# Patient Record
Sex: MALE | Race: WHITE | URBAN - METROPOLITAN AREA
[De-identification: names, ages, dates, MRNs, and addresses within clinical notes are randomized per-mention and may not be internally consistent; named-entity substitution may affect disease eponyms.]

---

## 2020-10-21 RX ORDER — VALACYCLOVIR HYDROCHLORIDE 1 G/1
TABLET, FILM COATED ORAL
Qty: 90 TAB | OUTPATIENT
Start: 2020-10-21

## 2020-10-21 NOTE — TELEPHONE ENCOUNTER
He does not understand why he needs to be seen to get his refills, can you let me know what you would like me to tell him.

## 2020-10-29 ENCOUNTER — VIRTUAL VISIT (OUTPATIENT)
Dept: PRIMARY CARE CLINIC | Age: 50
End: 2020-10-29
Payer: COMMERCIAL

## 2020-10-29 VITALS
HEIGHT: 77 IN | OXYGEN SATURATION: 98 % | RESPIRATION RATE: 16 BRPM | SYSTOLIC BLOOD PRESSURE: 127 MMHG | DIASTOLIC BLOOD PRESSURE: 87 MMHG | TEMPERATURE: 98.4 F

## 2020-10-29 DIAGNOSIS — A60.01 HERPES SIMPLEX INFECTION OF PENIS: Primary | ICD-10-CM

## 2020-10-29 PROCEDURE — 99213 OFFICE O/P EST LOW 20 MIN: CPT | Performed by: NURSE PRACTITIONER

## 2020-10-29 RX ORDER — SILDENAFIL 100 MG/1
100 TABLET, FILM COATED ORAL AS NEEDED
COMMUNITY
End: 2021-06-02

## 2020-10-29 RX ORDER — VALACYCLOVIR HYDROCHLORIDE 1 G/1
1000 TABLET, FILM COATED ORAL DAILY
Qty: 90 TAB | Refills: 3 | Status: SHIPPED | OUTPATIENT
Start: 2020-10-29 | End: 2021-06-02 | Stop reason: SDUPTHER

## 2020-10-29 RX ORDER — VALACYCLOVIR HYDROCHLORIDE 1 G/1
TABLET, FILM COATED ORAL
COMMUNITY
End: 2020-10-29 | Stop reason: SDUPTHER

## 2020-10-29 NOTE — PROGRESS NOTES
HISTORY OF PRESENT ILLNESS  Samantha Kaba is a 53 y.o. male presents for  Follow up on genital herpes and meds for same and med refill for same    Current Drug regimen is valacyclovir 1 gr appropriate and no new symptoms are expressed by patient. Denies CP, Diff breathing, Palpitations recent weight loss or new difficulties with sleep. There were no vitals filed for this visit. There is no problem list on file for this patient. There are no active problems to display for this patient. Current Outpatient Medications   Medication Sig Dispense Refill    valACYclovir (VALTREX) 1 gram tablet Take  by mouth.  sildenafil citrate (VIAGRA) 100 mg tablet Take 100 mg by mouth as needed for Erectile Dysfunction. No Known Allergies  History reviewed. No pertinent past medical history. Past Surgical History:   Procedure Laterality Date    HX OTHER SURGICAL      bone surgery from playing football     Family History   Problem Relation Age of Onset    Heart Disease Father     Other Father         cerebrovascular accident    Heart Disease Maternal Grandfather     Hypertension Maternal Grandfather      Social History     Tobacco Use    Smoking status: Never Smoker    Smokeless tobacco: Never Used   Substance Use Topics    Alcohol use: Yes     Comment: occ           Review of Systems   Constitutional: Negative for fever. Respiratory: Negative for cough and shortness of breath. Cardiovascular: Negative for chest pain and palpitations. Gastrointestinal: Negative for constipation and diarrhea. Neurological: Negative for dizziness. Psychiatric/Behavioral: Negative for depression. The patient is not nervous/anxious and does not have insomnia. Physical Exam  Constitutional:       Appearance: Normal appearance. He is normal weight. HENT:      Head: Normocephalic. Eyes:      Extraocular Movements: Extraocular movements intact.    Neck:      Comments: Normal neck movement as seen on video chat  Pulmonary:      Effort: Pulmonary effort is normal.   Neurological:      General: No focal deficit present. Mental Status: He is alert and oriented to person, place, and time. Psychiatric:         Mood and Affect: Mood normal.         Behavior: Behavior normal.         Thought Content: Thought content normal.         Judgment: Judgment normal.           ASSESSMENT and PLAN    1. Herpes simplex infection of penis  Refill on meds. . labs for checking liver    - CBC WITH AUTOMATED DIFF  - METABOLIC PANEL, COMPREHENSIVE  - valACYclovir (VALTREX) 1 gram tablet; Take 1 Tab by mouth daily. Dispense: 90 Tab; Refill: [address], TAYLER Hernandez who was evaluated through a synchronous (real-time) audio-video encounter, and/or his healthcare decision maker, is aware that it is a billable service, with coverage as determined by his insurance carrier. He provided verbal consent to proceed: Yes, and patient identification was verified. It was conducted pursuant to the emergency declaration under the [address] [place] [address] authority and the [employer] and [employer] Act. A caregiver was present when appropriate. Ability to conduct physical exam was limited. I was at home. The patient was at home. I have reviewed the patient's controlled substance prescription history thru the Prescription Monitoring Program, so that the prescription(s) for a controlled substance can be given.

## 2020-10-29 NOTE — PATIENT INSTRUCTIONS
Learning About Physical Activity What is physical activity? Physical activity is any kind of activity that gets your body moving. The types of physical activity that can help you get fit and stay healthy include: · Aerobic or \"cardio\" activities that make your heart beat faster and make you breathe harder, such as brisk walking, riding a bike, or running. Aerobic activities strengthen your heart and lungs and build up your endurance. · Strength training activities that make your muscles work against, or \"resist,\" something, such as lifting weights or doing push-ups. These activities help tone and strengthen your muscles. · Stretches that allow you to move your joints and muscles through their full range of motion. Stretching helps you be more flexible and avoid injury. What are the benefits of physical activity? Being active is one of the best things you can do for your health. It helps you to: · Feel stronger and have more energy to do all the things you like to do. · Focus better at school or work. · Feel, think, and sleep better. · Reach and stay at a healthy weight. · Lose fat and build lean muscle. · Lower your risk for serious health problems. · Keep your bones, muscles, and joints strong. How can you make physical activity part of your life? Get at least 30 minutes of exercise on most days of the week. Walking is a good choice. You also may want to do other activities, such as running, swimming, cycling, or playing tennis or team sports. Pick activities that you likeones that make your heart beat faster, your muscles stronger, and your muscles and joints more flexible. If you find more than one thing you like doing, do them all. You don't have to do the same thing every day. Get your heart pumping every day. Any activity that makes your heart beat faster and keeps it at that rate for a while counts. Here are some great ways to get your heart beating faster: · Go for a brisk walk, run, or bike ride. · Go for a hike or swim. · Go in-line skating. · Play a game of touch football, basketball, or soccer. · Ride a bike. · Play tennis or racquetball. · Climb stairs. Even some household chores can be aerobicjust do them at a faster pace. Vacuuming, raking or mowing the lawn, sweeping the garage, and washing and waxing the car all can help get your heart rate up. Strengthen your muscles during the week. You don't have to lift heavy weights or grow big, bulky muscles to get stronger. Doing a few simple activities that make your muscles work against, or \"resist,\" something can help you get stronger. For example, you can: · Do push-ups or sit-ups, which use your own body weight as resistance. · Lift weights or dumbbells or use stretch bands at home or in a gym or community center. Stretch your muscles often. Stretching will help you as you become more active. It can help you stay flexible, loosen tight muscles, and avoid injury. It can also help improve your balance and posture and can be a great way to relax. Be sure to stretch the muscles you'll be using when you work out. It's best to warm your muscles slightly before you stretch them. Walk or do some other light aerobic activity for a few minutes, and then start stretching. When you stretch your muscles: · Do it slowly. Stretching is not about going fast or making sudden movements. · Don't push or bounce during a stretch. · Hold each stretch for at least 15 to 30 seconds, if you can. You should feel a stretch in the muscle, but not pain. · Breathe out as you do the stretch. Then breathe in as you hold the stretch. Don't hold your breath. If you're worried about how more activity might affect your health, have a checkup before you start. Follow any special advice your doctor gives you for getting a smart start. Where can you learn more? Go to http://www.[employer]et[employer]etogether.ASYM III/ Enter J365 in the search box to learn more about \"Learning About Physical Activity. \" Current as of: January 16, 2020               Content Version: 12.6 © 3355-5497 [employer]. Care instructions adapted under license by [employer] (which disclaims liability or warranty for this information). If you have questions about a medical condition or this instruction, always ask your healthcare professional. [place] [address] any warranty or liability for your use of this information.

## 2021-06-02 ENCOUNTER — OFFICE VISIT (OUTPATIENT)
Dept: PRIMARY CARE CLINIC | Age: 51
End: 2021-06-02
Payer: COMMERCIAL

## 2021-06-02 VITALS
WEIGHT: 221 LBS | BODY MASS INDEX: 26.09 KG/M2 | RESPIRATION RATE: 16 BRPM | HEIGHT: 77 IN | DIASTOLIC BLOOD PRESSURE: 96 MMHG | TEMPERATURE: 98.3 F | SYSTOLIC BLOOD PRESSURE: 129 MMHG | OXYGEN SATURATION: 98 %

## 2021-06-02 DIAGNOSIS — Z12.11 COLON CANCER SCREENING: Chronic | ICD-10-CM

## 2021-06-02 DIAGNOSIS — Z00.00 ANNUAL PHYSICAL EXAM: Primary | ICD-10-CM

## 2021-06-02 DIAGNOSIS — E78.2 MIXED HYPERLIPIDEMIA: ICD-10-CM

## 2021-06-02 DIAGNOSIS — A60.01 HERPES SIMPLEX INFECTION OF PENIS: ICD-10-CM

## 2021-06-02 DIAGNOSIS — B00.9 HSV INFECTION: Chronic | ICD-10-CM

## 2021-06-02 DIAGNOSIS — N52.9 ERECTILE DYSFUNCTION, UNSPECIFIED ERECTILE DYSFUNCTION TYPE: Chronic | ICD-10-CM

## 2021-06-02 DIAGNOSIS — Z82.49 FAMILY HISTORY OF MYOCARDIAL INFARCTION: Chronic | ICD-10-CM

## 2021-06-02 LAB
BILIRUB UR QL STRIP: NEGATIVE
GLUCOSE UR-MCNC: NEGATIVE MG/DL
KETONES P FAST UR STRIP-MCNC: NEGATIVE MG/DL
PH UR STRIP: 5.5 [PH] (ref 4.6–8)
PROT UR QL STRIP: NEGATIVE
SP GR UR STRIP: 1.03 (ref 1–1.03)
UA UROBILINOGEN AMB POC: NORMAL (ref 0.2–1)
URINALYSIS CLARITY POC: CLEAR
URINALYSIS COLOR POC: YELLOW
URINE BLOOD POC: NEGATIVE
URINE LEUKOCYTES POC: NEGATIVE
URINE NITRITES POC: NEGATIVE

## 2021-06-02 PROCEDURE — 99396 PREV VISIT EST AGE 40-64: CPT | Performed by: NURSE PRACTITIONER

## 2021-06-02 PROCEDURE — 81003 URINALYSIS AUTO W/O SCOPE: CPT | Performed by: NURSE PRACTITIONER

## 2021-06-02 RX ORDER — VALACYCLOVIR HYDROCHLORIDE 1 G/1
1000 TABLET, FILM COATED ORAL DAILY
Qty: 90 TABLET | Refills: 3 | Status: SHIPPED | OUTPATIENT
Start: 2021-06-02 | End: 2021-10-25 | Stop reason: SDUPTHER

## 2021-06-02 RX ORDER — SILDENAFIL CITRATE 20 MG/1
20 TABLET ORAL 3 TIMES DAILY
Qty: 90 TABLET | Refills: 2 | Status: SHIPPED | OUTPATIENT
Start: 2021-06-02 | End: 2021-12-01 | Stop reason: SDUPTHER

## 2021-06-02 NOTE — PROGRESS NOTES
HISTORY OF PRESENT Justina Shipman is a 49 y.o. male presents for   Chief Complaint   Patient presents with    Physical   Here for physical and work-up dad had MIs starting at age 55 with multiple MIs and  an early age once evaluated for same    Patient is now 49years old has not had a colonoscopy as of yet and is willing to get 1    Complains of ringing in ears has never tried allergy pills      Vitals:    21 0803   BP: (!) 129/96   BP 1 Location: Right arm   BP Patient Position: Sitting   Temp: 98.3 °F (36.8 °C)   TempSrc: Oral   Resp: 16   Height: 6' 5\" (1.956 m)   Weight: 221 lb (100.2 kg)   SpO2: 98%      Patient Active Problem List   Diagnosis Code    Family history of myocardial infarction Z80.55    Colon cancer screening Z12.11    HSV infection B00.9     Patient Active Problem List    Diagnosis Date Noted    Family history of myocardial infarction 2021    Colon cancer screening 2021    HSV infection 2021     Current Outpatient Medications   Medication Sig Dispense Refill    sildenafiL, pulmonary hypertension, (REVATIO) 20 mg tablet Take 1 Tablet by mouth three (3) times daily. 90 Tablet 2    valACYclovir (VALTREX) 1 gram tablet Take 1 Tablet by mouth daily. 90 Tablet 3     No Known Allergies  History reviewed. No pertinent past medical history. Past Surgical History:   Procedure Laterality Date    HX OTHER SURGICAL      bone surgery from playing football     Family History   Problem Relation Age of Onset    Heart Disease Father     Other Father         cerebrovascular accident    Heart Disease Maternal Grandfather     Hypertension Maternal Grandfather      Social History     Tobacco Use    Smoking status: Current Some Day Smoker    Smokeless tobacco: Never Used   Substance Use Topics    Alcohol use: Yes     Comment: occ           Review of Systems   Constitutional: Negative for fever and weight loss.    HENT: Positive for tinnitus (For a couple of years). Negative for sore throat. Eyes: Negative for blurred vision and double vision. Respiratory: Negative for shortness of breath. Cardiovascular: Negative for chest pain, palpitations and leg swelling. Gastrointestinal: Negative for constipation and diarrhea. Skin: Negative for itching and rash. Neurological: Negative for dizziness. Endo/Heme/Allergies: Environmental allergies: Denies history of. Does not bruise/bleed easily. Psychiatric/Behavioral: Negative for depression. The patient is not nervous/anxious and does not have insomnia. Physical Exam  Vitals reviewed. Constitutional:       Appearance: Normal appearance. He is normal weight. HENT:      Head: Normocephalic. Nose: Nose normal.      Mouth/Throat:      Mouth: Mucous membranes are moist.   Eyes:      Extraocular Movements: Extraocular movements intact. Pupils: Pupils are equal, round, and reactive to light. Cardiovascular:      Rate and Rhythm: Normal rate and regular rhythm. Pulses: Normal pulses. Heart sounds: Normal heart sounds. Pulmonary:      Effort: Pulmonary effort is normal. No respiratory distress. Breath sounds: Normal breath sounds. Musculoskeletal:         General: Normal range of motion. Cervical back: Normal range of motion and neck supple. Skin:     General: Skin is warm and dry. Neurological:      General: No focal deficit present. Mental Status: He is alert and oriented to person, place, and time. Psychiatric:         Attention and Perception: Attention and perception normal.         Mood and Affect: Affect normal.         Speech: Speech normal.         Behavior: Behavior normal. Behavior is cooperative. Thought Content: Thought content normal.         Cognition and Memory: Cognition and memory normal.         Judgment: Judgment normal.           ASSESSMENT and PLAN  Diagnoses and all orders for this visit:    1.  Annual physical exam  -     REFERRAL TO CARDIOLOGY  -     REFERRAL TO GASTROENTEROLOGY  -     LIPID PANEL  -     AMB POC URINALYSIS DIP STICK AUTO W/O MICRO    2. Colon cancer screening  Comments:  GI consult for colonoscopy  Orders:  -     REFERRAL TO GASTROENTEROLOGY    3. Family history of myocardial infarction  Comments:  Pretty high risk send to Dr. Aleksandr Cosme for risk stratification  Orders:  -     REFERRAL TO CARDIOLOGY  -     LIPID PANEL    4. HSV infection  Comments: With meds this kept any breakouts a day positive for HSV 1 and 2    5. Herpes simplex infection of penis  -     valACYclovir (VALTREX) 1 gram tablet; Take 1 Tablet by mouth daily. 6. Erectile dysfunction, unspecified erectile dysfunction type  Comments:  Chronic intermittent uses small doses of Viagra to good effect  Orders:  -     CBC WITH AUTOMATED DIFF  -     METABOLIC PANEL, COMPREHENSIVE  -     sildenafiL, pulmonary hypertension, (REVATIO) 20 mg tablet; Take 1 Tablet by mouth three (3) times daily.          Luca Bob, TAYLER

## 2021-06-02 NOTE — PROGRESS NOTES
1. Have you been to the ER, urgent care clinic since your last visit? Hospitalized since your last visit? No    2. Have you seen or consulted any other health care providers outside of the [address] [place] [address] since your last visit? Include any pap smears or colon screening.  No

## 2021-06-03 LAB
ALBUMIN SERPL-MCNC: 4.8 G/DL (ref 4–5)
ALBUMIN/GLOB SERPL: 2.1 {RATIO} (ref 1.2–2.2)
ALP SERPL-CCNC: 87 IU/L (ref 48–121)
ALT SERPL-CCNC: 22 IU/L (ref 0–44)
AST SERPL-CCNC: 22 IU/L (ref 0–40)
BASOPHILS # BLD AUTO: 0.1 X10E3/UL (ref 0–0.2)
BASOPHILS NFR BLD AUTO: 1 %
BILIRUB SERPL-MCNC: 0.3 MG/DL (ref 0–1.2)
BUN SERPL-MCNC: 24 MG/DL (ref 6–24)
BUN/CREAT SERPL: 23 (ref 9–20)
CALCIUM SERPL-MCNC: 10.2 MG/DL (ref 8.7–10.2)
CHLORIDE SERPL-SCNC: 103 MMOL/L (ref 96–106)
CHOLEST SERPL-MCNC: 268 MG/DL (ref 100–199)
CO2 SERPL-SCNC: 26 MMOL/L (ref 20–29)
CREAT SERPL-MCNC: 1.06 MG/DL (ref 0.76–1.27)
EOSINOPHIL # BLD AUTO: 0.6 X10E3/UL (ref 0–0.4)
EOSINOPHIL NFR BLD AUTO: 8 %
ERYTHROCYTE [DISTWIDTH] IN BLOOD BY AUTOMATED COUNT: 13.7 % (ref 11.6–15.4)
GLOBULIN SER CALC-MCNC: 2.3 G/DL (ref 1.5–4.5)
GLUCOSE SERPL-MCNC: 95 MG/DL (ref 65–99)
HCT VFR BLD AUTO: 47.5 % (ref 37.5–51)
HDLC SERPL-MCNC: 47 MG/DL
HGB BLD-MCNC: 15.8 G/DL (ref 13–17.7)
IMM GRANULOCYTES # BLD AUTO: 0 X10E3/UL (ref 0–0.1)
IMM GRANULOCYTES NFR BLD AUTO: 0 %
LDLC SERPL CALC-MCNC: 194 MG/DL (ref 0–99)
LYMPHOCYTES # BLD AUTO: 1.9 X10E3/UL (ref 0.7–3.1)
LYMPHOCYTES NFR BLD AUTO: 25 %
MCH RBC QN AUTO: 30.7 PG (ref 26.6–33)
MCHC RBC AUTO-ENTMCNC: 33.3 G/DL (ref 31.5–35.7)
MCV RBC AUTO: 92 FL (ref 79–97)
MONOCYTES # BLD AUTO: 0.6 X10E3/UL (ref 0.1–0.9)
MONOCYTES NFR BLD AUTO: 8 %
NEUTROPHILS # BLD AUTO: 4.3 X10E3/UL (ref 1.4–7)
NEUTROPHILS NFR BLD AUTO: 58 %
PLATELET # BLD AUTO: 232 X10E3/UL (ref 150–450)
POTASSIUM SERPL-SCNC: 4.6 MMOL/L (ref 3.5–5.2)
PROT SERPL-MCNC: 7.1 G/DL (ref 6–8.5)
RBC # BLD AUTO: 5.14 X10E6/UL (ref 4.14–5.8)
SODIUM SERPL-SCNC: 142 MMOL/L (ref 134–144)
TRIGL SERPL-MCNC: 148 MG/DL (ref 0–149)
VLDLC SERPL CALC-MCNC: 27 MG/DL (ref 5–40)
WBC # BLD AUTO: 7.5 X10E3/UL (ref 3.4–10.8)

## 2021-06-03 RX ORDER — ROSUVASTATIN CALCIUM 5 MG/1
5 TABLET, COATED ORAL
Qty: 30 TABLET | Refills: 1 | Status: SHIPPED | OUTPATIENT
Start: 2021-06-03 | End: 2022-02-07 | Stop reason: ALTCHOICE

## 2021-06-03 NOTE — PROGRESS NOTES
Please call patient about labs. Fairly normal labs but cholesterol is VERY abnormal please fax this over to dr Mara Samuels (or whoever Bony referred him to) as part of their treatment protocol I am starting him on crestor. 5 mg and he can change as loree.

## 2021-06-07 NOTE — PROGRESS NOTES
Called patient with results and instructed him on diet and exercise and his new rx for Crestor at the pharmacy

## 2021-07-02 PROBLEM — Z12.11 COLON CANCER SCREENING: Status: RESOLVED | Noted: 2021-06-02 | Resolved: 2021-07-02

## 2021-10-25 DIAGNOSIS — A60.01 HERPES SIMPLEX INFECTION OF PENIS: ICD-10-CM

## 2021-10-25 NOTE — TELEPHONE ENCOUNTER
Patient is requesting a refill for valcyclovir. He said he had no clue Deniz sent a prescription to [facility] because he has always gotten maintenance [facility]s [facility] and did not need a new script at that time. he has 10 left.

## 2021-10-27 RX ORDER — VALACYCLOVIR HYDROCHLORIDE 1 G/1
1000 TABLET, FILM COATED ORAL DAILY
Qty: 90 TABLET | Refills: 0 | Status: SHIPPED | OUTPATIENT
Start: 2021-10-27 | End: 2021-12-01 | Stop reason: SDUPTHER

## 2021-12-01 ENCOUNTER — OFFICE VISIT (OUTPATIENT)
Dept: PRIMARY CARE CLINIC | Age: 51
End: 2021-12-01
Payer: COMMERCIAL

## 2021-12-01 VITALS
OXYGEN SATURATION: 97 % | TEMPERATURE: 98 F | RESPIRATION RATE: 18 BRPM | HEART RATE: 81 BPM | BODY MASS INDEX: 24.56 KG/M2 | DIASTOLIC BLOOD PRESSURE: 86 MMHG | WEIGHT: 208 LBS | SYSTOLIC BLOOD PRESSURE: 130 MMHG | HEIGHT: 77 IN

## 2021-12-01 DIAGNOSIS — Z23 ENCOUNTER FOR IMMUNIZATION: ICD-10-CM

## 2021-12-01 DIAGNOSIS — A60.01 HERPES SIMPLEX INFECTION OF PENIS: ICD-10-CM

## 2021-12-01 DIAGNOSIS — E78.2 MIXED HYPERLIPIDEMIA: Primary | ICD-10-CM

## 2021-12-01 DIAGNOSIS — N52.9 ERECTILE DYSFUNCTION, UNSPECIFIED ERECTILE DYSFUNCTION TYPE: Chronic | ICD-10-CM

## 2021-12-01 PROCEDURE — 90471 IMMUNIZATION ADMIN: CPT | Performed by: NURSE PRACTITIONER

## 2021-12-01 PROCEDURE — 90756 CCIIV4 VACC ABX FREE IM: CPT | Performed by: FAMILY MEDICINE

## 2021-12-01 PROCEDURE — 99214 OFFICE O/P EST MOD 30 MIN: CPT | Performed by: NURSE PRACTITIONER

## 2021-12-01 RX ORDER — SILDENAFIL CITRATE 20 MG/1
20 TABLET ORAL 3 TIMES DAILY
Qty: 90 TABLET | Refills: 2 | Status: SHIPPED | OUTPATIENT
Start: 2021-12-01

## 2021-12-01 RX ORDER — VALACYCLOVIR HYDROCHLORIDE 1 G/1
1000 TABLET, FILM COATED ORAL DAILY
Qty: 90 TABLET | Refills: 0 | Status: SHIPPED | OUTPATIENT
Start: 2021-12-01 | End: 2022-03-10

## 2021-12-01 NOTE — LETTER
12/1/2021 1:19 PM    Mr. Yvonne Archer  [address] [place] [address] [place] [address]      To Whom It May Concern,    Yvonne Archer 1970 reported they had a colonoscopy done with your office. Please fax most recent colonoscopy to our office at [phone]. Please feel free to contact my office if you have any questions or concerns.   Thank you for your assistance in this matter          Sincerely,      Brianne Mancera NP

## 2021-12-01 NOTE — PROGRESS NOTES
Chief Complaint   Patient presents with    Follow Up Chronic Condition     pt is asking for refills on all meds below       1. Have you been to the ER, urgent care clinic since your last visit? Hospitalized since your last visit?no    2. Have you seen or consulted any other health care providers outside of the [address] [place] [address] since your last visit? Include any pap smears or colon screening.  no    Visit Vitals  /86 (BP 1 Location: Right arm, BP Patient Position: At rest, BP Cuff Size: Adult)   Pulse 81   Temp 98 °F (36.7 °C) (Temporal)   Resp 18   Ht 6' 5\" (1.956 m)   Wt 208 lb (94.3 kg)   SpO2 97%   BMI 24.67 kg/m²

## 2021-12-01 NOTE — PROGRESS NOTES
HISTORY OF PRESENT ILLNESS  Samira Macedo is a 49 y.o. male presents for medication follow up. Hyperlipidemia: Patient was started on crestor x6 months ago by Mery Burris NP. Patient has stopped this medication due to side effects to include leg swelling and pain. Patient has been seeing Dr. Gustafson 96 and has been making lifestyle changes (exercise, eating lots of vegetables and reducing fatty foods). HSV:  Patient reported that he has been using valtrex daily for a preventative for 2 years. Patient reported he has not had an outbreaks. Discussed stopping using this daily and using only PRN for now. If consistent outbreaks appear he can restart daily. Erectile Dysfunction:  Patient reported that his erectile dysfunction is well controlled on Viagra. Uses PRN without incident. Vitals:    12/01/21 1255   BP: 130/86   Pulse: 81   Resp: 18   Temp: 98 °F (36.7 °C)   TempSrc: Temporal   SpO2: 97%   Weight: 208 lb (94.3 kg)   Height: 6' 5\" (1.956 m)     Patient Active Problem List   Diagnosis Code    Family history of myocardial infarction Z82.49    HSV infection B00.9     Patient Active Problem List    Diagnosis Date Noted    Family history of myocardial infarction 06/02/2021    HSV infection 06/02/2021     Current Outpatient Medications   Medication Sig Dispense Refill    sildenafiL, pulmonary hypertension, (REVATIO) 20 mg tablet Take 1 Tablet by mouth three (3) times daily. 90 Tablet 2    valACYclovir (VALTREX) 1 gram tablet Take 1 Tablet by mouth daily. 90 Tablet 0    rosuvastatin (CRESTOR) 5 mg tablet Take 1 Tablet by mouth nightly. 30 Tablet 1     No Known Allergies  History reviewed. No pertinent past medical history.   Past Surgical History:   Procedure Laterality Date    HX OTHER SURGICAL      bone surgery from playing football     Family History   Problem Relation Age of Onset    Heart Disease Father     Other Father         cerebrovascular accident    Heart Disease Maternal Grandfather     Hypertension Maternal Grandfather      Social History     Tobacco Use    Smoking status: Current Some Day Smoker    Smokeless tobacco: Never Used   Substance Use Topics    Alcohol use: Yes     Comment: occ           Review of Systems   Constitutional: Negative for malaise/fatigue and weight loss. Eyes: Negative for blurred vision and double vision. Respiratory: Negative for cough and shortness of breath. Cardiovascular: Negative for chest pain, palpitations and leg swelling. Gastrointestinal: Negative for heartburn and nausea. Musculoskeletal: Negative for joint pain and myalgias. Skin: Negative for itching and rash. Neurological: Negative for dizziness, tingling, loss of consciousness, weakness and headaches. Endo/Heme/Allergies: Does not bruise/bleed easily. Psychiatric/Behavioral: Negative for depression. The patient is not nervous/anxious. Physical Exam  Constitutional:       Appearance: Normal appearance. HENT:      Head: Normocephalic and atraumatic. Eyes:      Extraocular Movements: Extraocular movements intact. Conjunctiva/sclera: Conjunctivae normal.      Pupils: Pupils are equal, round, and reactive to light. Cardiovascular:      Rate and Rhythm: Normal rate and regular rhythm. Pulses: Normal pulses. Pulmonary:      Effort: Pulmonary effort is normal.      Breath sounds: Normal breath sounds. Musculoskeletal:         General: Normal range of motion. Skin:     General: Skin is warm and dry. Neurological:      General: No focal deficit present. Mental Status: He is alert and oriented to person, place, and time. Psychiatric:         Mood and Affect: Mood normal.         Behavior: Behavior normal.           ASSESSMENT and PLAN  Diagnoses and all orders for this visit:    1. Mixed hyperlipidemia  Comments:  check labs today, consider trying a different statin due to side effects from crestor.    Orders:  -     LIPID PANEL  - METABOLIC PANEL, COMPREHENSIVE    2. Erectile dysfunction, unspecified erectile dysfunction type  Comments:  Chronic intermittent uses small doses of Viagra to good effect  Orders:  -     sildenafiL, pulmonary hypertension, (REVATIO) 20 mg tablet; Take 1 Tablet by mouth three (3) times daily. 3. Herpes simplex infection of penis  -     valACYclovir (VALTREX) 1 gram tablet; Take 1 Tablet by mouth daily.     4. Encounter for immunization  -     INFLUENZA VACCINE (Veronicaport 0.5 ML)  -     [place] IMMUNIZ ADMIN,1 SINGLE/COMB VAC/TOXOID         Denise Ferraro NP

## 2021-12-02 LAB
ALBUMIN SERPL-MCNC: 4.7 G/DL (ref 4–5)
ALBUMIN/GLOB SERPL: 2.1 {RATIO} (ref 1.2–2.2)
ALP SERPL-CCNC: 91 IU/L (ref 44–121)
ALT SERPL-CCNC: 23 IU/L (ref 0–44)
AST SERPL-CCNC: 24 IU/L (ref 0–40)
BILIRUB SERPL-MCNC: 0.2 MG/DL (ref 0–1.2)
BUN SERPL-MCNC: 15 MG/DL (ref 6–24)
BUN/CREAT SERPL: 19 (ref 9–20)
CALCIUM SERPL-MCNC: 9.2 MG/DL (ref 8.7–10.2)
CHLORIDE SERPL-SCNC: 103 MMOL/L (ref 96–106)
CHOLEST SERPL-MCNC: 224 MG/DL (ref 100–199)
CO2 SERPL-SCNC: 25 MMOL/L (ref 20–29)
CREAT SERPL-MCNC: 0.79 MG/DL (ref 0.76–1.27)
GLOBULIN SER CALC-MCNC: 2.2 G/DL (ref 1.5–4.5)
GLUCOSE SERPL-MCNC: 95 MG/DL (ref 65–99)
HDLC SERPL-MCNC: 43 MG/DL
LDLC SERPL CALC-MCNC: 155 MG/DL (ref 0–99)
POTASSIUM SERPL-SCNC: 4.4 MMOL/L (ref 3.5–5.2)
PROT SERPL-MCNC: 6.9 G/DL (ref 6–8.5)
SODIUM SERPL-SCNC: 141 MMOL/L (ref 134–144)
TRIGL SERPL-MCNC: 142 MG/DL (ref 0–149)
VLDLC SERPL CALC-MCNC: 26 MG/DL (ref 5–40)

## 2021-12-02 RX ORDER — ATORVASTATIN CALCIUM 10 MG/1
10 TABLET, FILM COATED ORAL DAILY
Qty: 30 TABLET | Refills: 0 | Status: SHIPPED | OUTPATIENT
Start: 2021-12-02 | End: 2022-02-07 | Stop reason: SDUPTHER

## 2021-12-22 ENCOUNTER — TELEPHONE (OUTPATIENT)
Dept: PRIMARY CARE CLINIC | Age: 51
End: 2021-12-22

## 2021-12-22 NOTE — TELEPHONE ENCOUNTER
I only sent in a 30 day supply because I wanted to make sure he did not have any side effects like he did on crestor. If no side effects, will send in additional refills.

## 2021-12-23 NOTE — TELEPHONE ENCOUNTER
He said he is not having side effects, but he says his cardiologist is recommending that he go back to Crestor because \"it is the best medication for this. \" When I mentioned that he said he had side effects, he said his cardiologist told him if he takes it every three days, he should not have the side effects. But is worried if you prescribe it he will only be prescribed enough to take every three days.  Wants to know what's best for him, not due for refill for a couple of weeks

## 2021-12-27 NOTE — TELEPHONE ENCOUNTER
Please call him back and let him know that crestor is stronger then atorvastatin however if he is having side effects from the crestor and is not using it then taking atorvastatin is better then nothing and is a good drug at lowering cholesterol. We could even try increasing the atorvastatin to 20 mg which is pretty comparable to the lower dose crestor. I also valve Dr. Jossue Deleon knowledge and recommendations so if he wants to stick with his game plan of crestor every 3 days, I'm okay with that too. Just let me know what he decides.

## 2022-02-07 DIAGNOSIS — E78.2 MIXED HYPERLIPIDEMIA: ICD-10-CM

## 2022-02-07 RX ORDER — ATORVASTATIN CALCIUM 10 MG/1
10 TABLET, FILM COATED ORAL DAILY
Qty: 90 TABLET | Refills: 1 | Status: SHIPPED | OUTPATIENT
Start: 2022-02-07

## 2022-02-23 ENCOUNTER — NURSE TRIAGE (OUTPATIENT)
Dept: OTHER | Facility: CLINIC | Age: 52
End: 2022-02-23

## 2022-02-23 NOTE — TELEPHONE ENCOUNTER
Received call from Louie Stanton at [facility] with Red Flag Complaint. Subjective: Caller states \"knot in between pinky and ring finger on left hand\"     Current Symptoms:   - knot - not swollen     Onset:  2 weeks ago     Associated Symptoms:     Pain Severity: Denies pain     Temperature:  Denies     What has been tried: none     LMP:  Na Pregnant: na    Recommended disposition: F/u with PCP within 2 weeks. Care advice provided, patient verbalizes understanding; denies any other questions or concerns; instructed to call back for any new or worsening symptoms. Warm transfer to [facility] at [facility] for appt for [facility] Provider: Thank you for allowing me to participate in the care of your patient. The patient was connected to triage in response to information provided to the [facility]. Please do not respond through this encounter as the response is not directed to a shared pool.       Reason for Disposition   [1] Small non-tender ball or lump AND [2] is a chronic symptom (recurrent or ongoing AND present > 4 weeks)    Protocols used: HAND [facility]

## 2022-02-24 ENCOUNTER — OFFICE VISIT (OUTPATIENT)
Dept: PRIMARY CARE CLINIC | Age: 52
End: 2022-02-24

## 2022-02-24 VITALS
RESPIRATION RATE: 16 BRPM | WEIGHT: 214.4 LBS | SYSTOLIC BLOOD PRESSURE: 130 MMHG | TEMPERATURE: 97.5 F | BODY MASS INDEX: 25.31 KG/M2 | HEART RATE: 87 BPM | HEIGHT: 77 IN | OXYGEN SATURATION: 96 % | DIASTOLIC BLOOD PRESSURE: 89 MMHG

## 2022-02-24 DIAGNOSIS — M72.0 DUPUYTREN'S DISEASE OF PALM OF LEFT HAND: Primary | ICD-10-CM

## 2022-02-24 PROCEDURE — 11900 INJECT SKIN LESIONS </W 7: CPT | Performed by: FAMILY MEDICINE

## 2022-02-24 PROCEDURE — 99213 OFFICE O/P EST LOW 20 MIN: CPT | Performed by: FAMILY MEDICINE

## 2022-02-24 NOTE — PROGRESS NOTES
Chief Complaint   Patient presents with    Follow Up Chronic Condition     lump to left hand (inside)        1. Have you been to the ER, urgent care clinic since your last visit? Hospitalized since your last visit? No     2. Have you seen or consulted any other health care providers outside of the [address] [place] [address] since your last visit? Include any pap smears or colon screening.  No     Visit Vitals  /89 (BP 1 Location: Right upper arm, BP Patient Position: Sitting, BP Cuff Size: Adult)   Pulse 87   Temp 97.5 °F (36.4 °C) (Temporal)   Resp 16   Ht 6' 5\" (1.956 m)   Wt 214 lb 6.4 oz (97.3 kg)   SpO2 96%   BMI 25.42 kg/m²

## 2022-02-24 NOTE — PROGRESS NOTES
Hernan Brotherica (: 1970) is a 47 y.o. male, established patient, here for evaluation of the following chief complaint(s):  Acute (Raised area inside left hand )       ASSESSMENT/PLAN:  Below is the assessment and plan developed based on review of pertinent history, physical exam, labs, studies, and medications. 1. Dupuytren's disease of palm of left hand  Acute  -     INJECTION,INTRALESIONAL;UP TO AND INCLUD 7 LESIONS  Consent obtained prior to intralesional injection L palm. Risk, benefits, alternatives discussed with patient. Patient agreed to proceed with procedure. Area was cleaned with iodine solution. Injection site was marked and 1/2 cc of dexamethasone 10 mg/mL mixed with 1/2 cc of 1% lidocaine without epinephrine was injected intralesionally. There were no complications and patient tolerated procedure well. Advised patient to wear padded gloves with heavy lifting. Return to office as needed. Return if symptoms worsen or fail to improve. SUBJECTIVE/OBJECTIVE:    52-year-old male presents to the office for chief complaint of nodule on the palmar aspect of the left hand. Nodule was noted 3 weeks ago. Patient states it is not increased in size, is nontender, and there are no overlying skin changes. Patient states he lifts weights without gloves. No Known Allergies  Current Outpatient Medications   Medication Sig    atorvastatin (LIPITOR) 10 mg tablet Take 1 Tablet by mouth daily.  sildenafiL, pulmonary hypertension, (REVATIO) 20 mg tablet Take 1 Tablet by mouth three (3) times daily.  valACYclovir (VALTREX) 1 gram tablet Take 1 Tablet by mouth daily. No current facility-administered medications for this visit. History reviewed. No pertinent past medical history.   Past Surgical History:   Procedure Laterality Date    HX OTHER SURGICAL      bone surgery from playing football     Family History   Problem Relation Age of Onset    Heart Disease Father    Nidia Other Father         cerebrovascular accident    Heart Disease Maternal Grandfather     Hypertension Maternal Grandfather      Social History     Tobacco Use   Smoking Status Current Some Day Smoker   Smokeless Tobacco Never Used   Tobacco Comment    4 or 5 a day          Review of Systems   Constitutional: Negative. Respiratory: Negative. Cardiovascular: Negative. Musculoskeletal: Negative. Skin:        Nodule palm of left hand         /89 (BP 1 Location: Right upper arm, BP Patient Position: Sitting, BP Cuff Size: Adult)   Pulse 87   Temp 97.5 °F (36.4 °C) (Temporal)   Resp 16   Ht 6' 5\" (1.956 m)   Wt 214 lb 6.4 oz (97.3 kg)   SpO2 96%   BMI 25.42 kg/m²    Physical Exam  Constitutional:       Appearance: Normal appearance. Cardiovascular:      Rate and Rhythm: Normal rate and regular rhythm. Pulses: Normal pulses. Musculoskeletal:      Right wrist: Normal.      Left wrist: Normal.        Arms:    Skin:     General: Skin is warm. Neurological:      Mental Status: He is alert. On this date 02/24/2022 I have spent 20 minutes reviewing previous notes, test results and face to face with the patient discussing the diagnosis and importance of compliance with the treatment plan as well as documenting on the day of the visit. An electronic signature was used to authenticate this note.   -- Roverto Yoo MD   [facility] [id number]  [address]

## 2022-03-10 DIAGNOSIS — A60.01 HERPES SIMPLEX INFECTION OF PENIS: ICD-10-CM

## 2022-03-10 RX ORDER — VALACYCLOVIR HYDROCHLORIDE 1 G/1
TABLET, FILM COATED ORAL
Qty: 90 TABLET | Refills: 3 | Status: SHIPPED | OUTPATIENT
Start: 2022-03-10

## 2022-03-19 PROBLEM — Z82.49 FAMILY HISTORY OF MYOCARDIAL INFARCTION: Status: ACTIVE | Noted: 2021-06-02

## 2022-03-19 PROBLEM — B00.9 HSV INFECTION: Status: ACTIVE | Noted: 2021-06-02

## 2023-05-23 RX ORDER — VALACYCLOVIR HYDROCHLORIDE 1 G/1
1 TABLET, FILM COATED ORAL DAILY
COMMUNITY
Start: 2022-03-10

## 2023-05-23 RX ORDER — SILDENAFIL CITRATE 20 MG/1
20 TABLET ORAL 3 TIMES DAILY
COMMUNITY
Start: 2021-12-01

## 2023-05-23 RX ORDER — ATORVASTATIN CALCIUM 10 MG/1
10 TABLET, FILM COATED ORAL DAILY
COMMUNITY
Start: 2022-02-07